# Patient Record
Sex: FEMALE | Race: WHITE | NOT HISPANIC OR LATINO | URBAN - METROPOLITAN AREA
[De-identification: names, ages, dates, MRNs, and addresses within clinical notes are randomized per-mention and may not be internally consistent; named-entity substitution may affect disease eponyms.]

---

## 2017-07-22 ENCOUNTER — EMERGENCY (EMERGENCY)
Facility: HOSPITAL | Age: 72
LOS: 1 days | Discharge: HOME | End: 2017-07-22
Attending: EMERGENCY MEDICINE | Admitting: EMERGENCY MEDICINE
Payer: MEDICARE

## 2017-07-22 VITALS
RESPIRATION RATE: 17 BRPM | TEMPERATURE: 98 F | DIASTOLIC BLOOD PRESSURE: 53 MMHG | OXYGEN SATURATION: 97 % | HEART RATE: 78 BPM | SYSTOLIC BLOOD PRESSURE: 121 MMHG

## 2017-07-22 VITALS
DIASTOLIC BLOOD PRESSURE: 57 MMHG | OXYGEN SATURATION: 96 % | SYSTOLIC BLOOD PRESSURE: 136 MMHG | TEMPERATURE: 99 F | RESPIRATION RATE: 20 BRPM | HEART RATE: 97 BPM

## 2017-07-22 LAB
ANION GAP SERPL CALC-SCNC: 13 MMOL/L — SIGNIFICANT CHANGE UP (ref 5–17)
APPEARANCE UR: CLEAR — SIGNIFICANT CHANGE UP
BACTERIA # UR AUTO: ABNORMAL /HPF
BASOPHILS # BLD AUTO: 0 K/UL — SIGNIFICANT CHANGE UP (ref 0–0.2)
BASOPHILS NFR BLD AUTO: 0.6 % — SIGNIFICANT CHANGE UP (ref 0–2)
BILIRUB UR-MCNC: NEGATIVE — SIGNIFICANT CHANGE UP
BUN SERPL-MCNC: 10 MG/DL — SIGNIFICANT CHANGE UP (ref 7–23)
CALCIUM SERPL-MCNC: 9.3 MG/DL — SIGNIFICANT CHANGE UP (ref 8.4–10.5)
CHLORIDE SERPL-SCNC: 100 MMOL/L — SIGNIFICANT CHANGE UP (ref 96–108)
CO2 SERPL-SCNC: 26 MMOL/L — SIGNIFICANT CHANGE UP (ref 22–31)
COLOR SPEC: YELLOW — SIGNIFICANT CHANGE UP
CREAT SERPL-MCNC: 0.76 MG/DL — SIGNIFICANT CHANGE UP (ref 0.5–1.3)
DIFF PNL FLD: NEGATIVE — SIGNIFICANT CHANGE UP
EOSINOPHIL # BLD AUTO: 0.1 K/UL — SIGNIFICANT CHANGE UP (ref 0–0.5)
EOSINOPHIL NFR BLD AUTO: 1.4 % — SIGNIFICANT CHANGE UP (ref 0–6)
GAS PNL BLDV: SIGNIFICANT CHANGE UP
GLUCOSE SERPL-MCNC: 98 MG/DL — SIGNIFICANT CHANGE UP (ref 70–99)
GLUCOSE UR QL: NEGATIVE — SIGNIFICANT CHANGE UP
HCT VFR BLD CALC: 39.9 % — SIGNIFICANT CHANGE UP (ref 34.5–45)
HGB BLD-MCNC: 13.5 G/DL — SIGNIFICANT CHANGE UP (ref 11.5–15.5)
KETONES UR-MCNC: NEGATIVE — SIGNIFICANT CHANGE UP
LEUKOCYTE ESTERASE UR-ACNC: NEGATIVE — SIGNIFICANT CHANGE UP
LYMPHOCYTES # BLD AUTO: 0.8 K/UL — LOW (ref 1–3.3)
LYMPHOCYTES # BLD AUTO: 12.7 % — LOW (ref 13–44)
MCHC RBC-ENTMCNC: 32.5 PG — SIGNIFICANT CHANGE UP (ref 27–34)
MCHC RBC-ENTMCNC: 33.8 GM/DL — SIGNIFICANT CHANGE UP (ref 32–36)
MCV RBC AUTO: 96.3 FL — SIGNIFICANT CHANGE UP (ref 80–100)
MONOCYTES # BLD AUTO: 0.9 K/UL — SIGNIFICANT CHANGE UP (ref 0–0.9)
MONOCYTES NFR BLD AUTO: 14.4 % — HIGH (ref 2–14)
NEUTROPHILS # BLD AUTO: 4.2 K/UL — SIGNIFICANT CHANGE UP (ref 1.8–7.4)
NEUTROPHILS NFR BLD AUTO: 70.8 % — SIGNIFICANT CHANGE UP (ref 43–77)
NITRITE UR-MCNC: NEGATIVE — SIGNIFICANT CHANGE UP
PH UR: 6.5 — SIGNIFICANT CHANGE UP (ref 5–8)
PLATELET # BLD AUTO: 250 K/UL — SIGNIFICANT CHANGE UP (ref 150–400)
POTASSIUM SERPL-MCNC: 4.1 MMOL/L — SIGNIFICANT CHANGE UP (ref 3.5–5.3)
POTASSIUM SERPL-SCNC: 4.1 MMOL/L — SIGNIFICANT CHANGE UP (ref 3.5–5.3)
PROT UR-MCNC: NEGATIVE — SIGNIFICANT CHANGE UP
RAPID RVP RESULT: SIGNIFICANT CHANGE UP
RBC # BLD: 4.14 M/UL — SIGNIFICANT CHANGE UP (ref 3.8–5.2)
RBC # FLD: 11.2 % — SIGNIFICANT CHANGE UP (ref 10.3–14.5)
RBC CASTS # UR COMP ASSIST: SIGNIFICANT CHANGE UP /HPF (ref 0–2)
SODIUM SERPL-SCNC: 139 MMOL/L — SIGNIFICANT CHANGE UP (ref 135–145)
SP GR SPEC: 1.01 — LOW (ref 1.01–1.02)
UROBILINOGEN FLD QL: NEGATIVE — SIGNIFICANT CHANGE UP
WBC # BLD: 5.9 K/UL — SIGNIFICANT CHANGE UP (ref 3.8–10.5)
WBC # FLD AUTO: 5.9 K/UL — SIGNIFICANT CHANGE UP (ref 3.8–10.5)
WBC UR QL: SIGNIFICANT CHANGE UP /HPF (ref 0–5)

## 2017-07-22 PROCEDURE — 84132 ASSAY OF SERUM POTASSIUM: CPT

## 2017-07-22 PROCEDURE — 83605 ASSAY OF LACTIC ACID: CPT

## 2017-07-22 PROCEDURE — 82803 BLOOD GASES ANY COMBINATION: CPT

## 2017-07-22 PROCEDURE — 82330 ASSAY OF CALCIUM: CPT

## 2017-07-22 PROCEDURE — 87040 BLOOD CULTURE FOR BACTERIA: CPT

## 2017-07-22 PROCEDURE — 81001 URINALYSIS AUTO W/SCOPE: CPT

## 2017-07-22 PROCEDURE — 94640 AIRWAY INHALATION TREATMENT: CPT

## 2017-07-22 PROCEDURE — 84295 ASSAY OF SERUM SODIUM: CPT

## 2017-07-22 PROCEDURE — 87581 M.PNEUMON DNA AMP PROBE: CPT

## 2017-07-22 PROCEDURE — 80048 BASIC METABOLIC PNL TOTAL CA: CPT

## 2017-07-22 PROCEDURE — 71046 X-RAY EXAM CHEST 2 VIEWS: CPT

## 2017-07-22 PROCEDURE — 87486 CHLMYD PNEUM DNA AMP PROBE: CPT

## 2017-07-22 PROCEDURE — 71020: CPT | Mod: 26

## 2017-07-22 PROCEDURE — 82435 ASSAY OF BLOOD CHLORIDE: CPT

## 2017-07-22 PROCEDURE — 84484 ASSAY OF TROPONIN QUANT: CPT

## 2017-07-22 PROCEDURE — 85014 HEMATOCRIT: CPT

## 2017-07-22 PROCEDURE — 87798 DETECT AGENT NOS DNA AMP: CPT

## 2017-07-22 PROCEDURE — 99284 EMERGENCY DEPT VISIT MOD MDM: CPT | Mod: 25

## 2017-07-22 PROCEDURE — 82947 ASSAY GLUCOSE BLOOD QUANT: CPT

## 2017-07-22 PROCEDURE — 87633 RESP VIRUS 12-25 TARGETS: CPT

## 2017-07-22 PROCEDURE — 93005 ELECTROCARDIOGRAM TRACING: CPT

## 2017-07-22 PROCEDURE — 85027 COMPLETE CBC AUTOMATED: CPT

## 2017-07-22 RX ORDER — GABAPENTIN 400 MG/1
0 CAPSULE ORAL
Qty: 0 | Refills: 0 | COMMUNITY

## 2017-07-22 RX ORDER — IPRATROPIUM/ALBUTEROL SULFATE 18-103MCG
3 AEROSOL WITH ADAPTER (GRAM) INHALATION ONCE
Qty: 0 | Refills: 0 | Status: COMPLETED | OUTPATIENT
Start: 2017-07-22 | End: 2017-07-22

## 2017-07-22 RX ORDER — SODIUM CHLORIDE 9 MG/ML
1000 INJECTION INTRAMUSCULAR; INTRAVENOUS; SUBCUTANEOUS ONCE
Qty: 0 | Refills: 0 | Status: COMPLETED | OUTPATIENT
Start: 2017-07-22 | End: 2017-07-22

## 2017-07-22 RX ORDER — ALBUTEROL 90 UG/1
2 AEROSOL, METERED ORAL
Qty: 1 | Refills: 0 | OUTPATIENT
Start: 2017-07-22 | End: 2017-08-21

## 2017-07-22 RX ORDER — NORTRIPTYLINE HYDROCHLORIDE 10 MG/1
0 CAPSULE ORAL
Qty: 0 | Refills: 0 | COMMUNITY

## 2017-07-22 RX ADMIN — SODIUM CHLORIDE 4000 MILLILITER(S): 9 INJECTION INTRAMUSCULAR; INTRAVENOUS; SUBCUTANEOUS at 06:42

## 2017-07-22 RX ADMIN — Medication 3 MILLILITER(S): at 07:56

## 2017-07-22 NOTE — ED ADULT NURSE REASSESSMENT NOTE - NS ED NURSE REASSESS COMMENT FT1
report received from MILTON Sanchez. Pt. AOx3, sitting in stretcher comfortably, breathing unlabored on RA. Skin warm pink and dry. Denies any pain/discomfort. Family at bedside. Pending results.

## 2017-07-22 NOTE — ED PROVIDER NOTE - OBJECTIVE STATEMENT
patient woke up with choking sensation and difficulty breathing. has had cough x days. denies chest pain. feels at baseline now.

## 2017-07-22 NOTE — ED PROVIDER NOTE - PLAN OF CARE
You were seen in the ED for a cough, your blood work and chest x-ray show no concerning abnormalities. You likely have bronchitis. Please complete the course of antibiotics prescribed to you.     You may use the albuterol inhaler as needed for cough. Return to the ED for worsening cough, trouble breathing or any other concerns

## 2017-07-22 NOTE — ED PROVIDER NOTE - PROGRESS NOTE DETAILS
Attending MD Cisse: patient re-evaluated, feels better. Awaiting troponin and UA. Will dc if negative

## 2017-07-22 NOTE — ED PROVIDER NOTE - CARE PLAN
Principal Discharge DX:	Cough  Instructions for follow-up, activity and diet:	You were seen in the ED for a cough, your blood work and chest x-ray show no concerning abnormalities. You likely have bronchitis. Please complete the course of antibiotics prescribed to you.     You may use the albuterol inhaler as needed for cough. Return to the ED for worsening cough, trouble breathing or any other concerns

## 2017-07-22 NOTE — ED PROVIDER NOTE - MEDICAL DECISION MAKING DETAILS
Attending Note ([place]): patient with episode of dyspnea this am that resolved. likely coughing fit.  will perform cxr and labs, low suspicion acs.  if negative, dc.

## 2017-07-22 NOTE — ED PROVIDER NOTE - MUSCULOSKELETAL, MLM
Spine appears normal, range of motion is not limited, no muscle or joint tenderness. no calf tenderness

## 2017-07-22 NOTE — ED ADULT NURSE NOTE - OBJECTIVE STATEMENT
Patient [id number] arrived ambulatory from triage c/o of Diff breathing and fever x 4 days. No PMH. Highest recorded fever 103 on Wednesday. Pt seen by PCP and placed on Levaquin, but today began having diff breathing. Pt took Tylenol for fever. Denies Dizziness weakness. Patient 02 sat 97% RA. Labs drawn and sent. Flu swab sent. Cultures drawn. Pt taken for xray, and fluids given. Afebrile at this time. Pending urine collection.

## 2017-07-22 NOTE — ED ADULT NURSE NOTE - CHPI ED SYMPTOMS NEG
no fever/no abdominal pain/no rash/no vomiting/no headache/no decreased eating/drinking/no chills/no cough/no diarrhea

## 2017-07-27 LAB
CULTURE RESULTS: SIGNIFICANT CHANGE UP
CULTURE RESULTS: SIGNIFICANT CHANGE UP
SPECIMEN SOURCE: SIGNIFICANT CHANGE UP
SPECIMEN SOURCE: SIGNIFICANT CHANGE UP

## 2017-12-20 ENCOUNTER — MEDICATION RENEWAL (OUTPATIENT)
Age: 72
End: 2017-12-20

## 2017-12-20 ENCOUNTER — APPOINTMENT (OUTPATIENT)
Dept: ENDOCRINOLOGY | Facility: CLINIC | Age: 72
End: 2017-12-20
Payer: MEDICARE

## 2017-12-20 VITALS
SYSTOLIC BLOOD PRESSURE: 124 MMHG | DIASTOLIC BLOOD PRESSURE: 58 MMHG | WEIGHT: 110 LBS | BODY MASS INDEX: 18.78 KG/M2 | HEART RATE: 69 BPM | HEIGHT: 64 IN | OXYGEN SATURATION: 99 %

## 2017-12-20 PROCEDURE — 77080 DXA BONE DENSITY AXIAL: CPT

## 2017-12-20 PROCEDURE — 99214 OFFICE O/P EST MOD 30 MIN: CPT | Mod: 25

## 2017-12-21 LAB
25(OH)D3 SERPL-MCNC: 47.5 NG/ML
CALCIUM SERPL-MCNC: 9.3 MG/DL
PARATHYROID HORMONE INTACT: 50 PG/ML
TSH SERPL-ACNC: 2.25 UIU/ML

## 2018-01-19 ENCOUNTER — APPOINTMENT (OUTPATIENT)
Dept: ENDOCRINOLOGY | Facility: CLINIC | Age: 73
End: 2018-01-19

## 2018-02-05 ENCOUNTER — APPOINTMENT (OUTPATIENT)
Dept: ENDOCRINOLOGY | Facility: CLINIC | Age: 73
End: 2018-02-05
Payer: MEDICARE

## 2018-02-05 VITALS
OXYGEN SATURATION: 98 % | BODY MASS INDEX: 18.78 KG/M2 | DIASTOLIC BLOOD PRESSURE: 52 MMHG | SYSTOLIC BLOOD PRESSURE: 136 MMHG | HEIGHT: 64 IN | WEIGHT: 110 LBS | HEART RATE: 80 BPM

## 2018-02-05 PROCEDURE — 99213 OFFICE O/P EST LOW 20 MIN: CPT

## 2018-04-11 ENCOUNTER — APPOINTMENT (OUTPATIENT)
Dept: ENDOCRINOLOGY | Facility: CLINIC | Age: 73
End: 2018-04-11
Payer: MEDICARE

## 2018-04-11 ENCOUNTER — MED ADMIN CHARGE (OUTPATIENT)
Age: 73
End: 2018-04-11

## 2018-04-11 PROCEDURE — 96372 THER/PROPH/DIAG INJ SC/IM: CPT

## 2018-04-11 RX ORDER — DENOSUMAB 60 MG/ML
60 INJECTION SUBCUTANEOUS
Qty: 0 | Refills: 0 | Status: COMPLETED | OUTPATIENT
Start: 2018-04-11

## 2018-04-11 RX ADMIN — DENOSUMAB 0 MG/ML: 60 INJECTION SUBCUTANEOUS at 00:00

## 2018-04-13 LAB
ALBUMIN SERPL ELPH-MCNC: 4.1 G/DL
ALP BLD-CCNC: 59 U/L
ALT SERPL-CCNC: 19 U/L
ANION GAP SERPL CALC-SCNC: 11 MMOL/L
AST SERPL-CCNC: 28 U/L
BILIRUB SERPL-MCNC: 0.2 MG/DL
BUN SERPL-MCNC: 10 MG/DL
CALCIUM SERPL-MCNC: 8.9 MG/DL
CHLORIDE SERPL-SCNC: 104 MMOL/L
CO2 SERPL-SCNC: 29 MMOL/L
CREAT SERPL-MCNC: 0.69 MG/DL
GLUCOSE SERPL-MCNC: 82 MG/DL
POTASSIUM SERPL-SCNC: 4.3 MMOL/L
PROT SERPL-MCNC: 6.5 G/DL
SODIUM SERPL-SCNC: 144 MMOL/L

## 2018-05-29 ENCOUNTER — EMERGENCY (EMERGENCY)
Facility: HOSPITAL | Age: 73
LOS: 1 days | Discharge: HOME | End: 2018-05-29
Attending: EMERGENCY MEDICINE | Admitting: EMERGENCY MEDICINE
Payer: MEDICARE

## 2018-05-29 VITALS
SYSTOLIC BLOOD PRESSURE: 154 MMHG | TEMPERATURE: 99 F | RESPIRATION RATE: 17 BRPM | WEIGHT: 104.94 LBS | HEIGHT: 64 IN | HEART RATE: 91 BPM | OXYGEN SATURATION: 96 % | DIASTOLIC BLOOD PRESSURE: 82 MMHG

## 2018-05-29 PROCEDURE — 73564 X-RAY EXAM KNEE 4 OR MORE: CPT | Mod: 26,LT

## 2018-05-29 PROCEDURE — 99283 EMERGENCY DEPT VISIT LOW MDM: CPT

## 2018-05-29 PROCEDURE — 73564 X-RAY EXAM KNEE 4 OR MORE: CPT

## 2018-05-29 NOTE — ED PROVIDER NOTE - PLAN OF CARE
1. Follow up with PMD within 24-48 hours.   2. Rest, ice and elevate knee.  Ambulate as tolerated.   Take acetaminophen (Tylenol) 650mg (2 regular strength tablets) as often as every 6 hours as needed for pain. Never take more than 4000mg of acetaminophen/Tylenol in any 24 hour period. Take ibuprofen (or Motrin) 600mg (3 tablets) up to 4 times per day as needed for pain with food or milk.    3. Worsening pain, swelling, weakness, numbness return to ER

## 2018-05-29 NOTE — ED PROVIDER NOTE - LOWER EXTREMITY EXAM, LEFT
+mild swelling to lateral aspect of left knee, no TTP to patella, tibial tuberosity or medial or lateral joint line, no TTP to head of fibula. +AROM, PROM, normal anterior/posterior drawer test./SWELLING/TENDERNESS

## 2018-05-29 NOTE — ED ADULT NURSE REASSESSMENT NOTE - NS ED NURSE REASSESS COMMENT FT1
patient resting comfortably in bed in no acute distress. patient has ice pack applied to left knee. pending xray results.

## 2018-05-29 NOTE — ED PROVIDER NOTE - ATTENDING CONTRIBUTION TO CARE
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  small contusion to l infrapatellar lateral region, no tend at joint space, from, plain films nl, ambulating at baseline to d.c home with nsaid, ortho follow up.

## 2018-05-29 NOTE — ED ADULT NURSE NOTE - CHIEF COMPLAINT QUOTE
trip and fall this AM, unwitnessed, no Head injury or LOC. Patient c/o left knee pain- no anticoagulants

## 2018-05-29 NOTE — ED PROVIDER NOTE - CARE PLAN
Principal Discharge DX:	Knee pain, left  Assessment and plan of treatment:	1. Follow up with PMD within 24-48 hours.   2. Rest, ice and elevate knee.  Ambulate as tolerated.   Take acetaminophen (Tylenol) 650mg (2 regular strength tablets) as often as every 6 hours as needed for pain. Never take more than 4000mg of acetaminophen/Tylenol in any 24 hour period. Take ibuprofen (or Motrin) 600mg (3 tablets) up to 4 times per day as needed for pain with food or milk.    3. Worsening pain, swelling, weakness, numbness return to ER

## 2018-05-29 NOTE — ED ADULT NURSE NOTE - OBJECTIVE STATEMENT
71 yo female presents to the ED from home c/o left knee pain s/p fall today. patent states she tripped and fell onto wood floor and did not hit head or have LOC. patient c/o left knee pain 7/10 on ambulation and with ROM. full ROM noted with pain. bruising and swelling noted to left knee. skin intact. patient denies chest pain, SOB, fevers, chills, N/V/D, HA, dizziness, cough, abdominal pain. VSS. MD at the bedside.

## 2018-05-29 NOTE — ED PROVIDER NOTE - OBJECTIVE STATEMENT
71 YO female PMhx chronic pain after colonoscopy complications of nerve damage on neurontin and norytriptiline, presents to ED c/o left knee pain s/p fall 9 AM this morning. Pt states she was at hotel in [place], had a mechanical trip and fall forward landing on her left knee. Pt was able to stand and walk with assistance. Describes swelling to the lateral aspect of left knee and some tenderness to palpation over area. Able to walk with some discomfort. Pt drove to ED using right leg. She has not used any pain medications and has not iced her left knee. Defers pain meds at this time. Denies head trauma, any other injuries, LOC, syncope, blood thinners, previous surgeries to left knee, numbness, tingling or weakness to left lower extremity.

## 2018-10-17 ENCOUNTER — APPOINTMENT (OUTPATIENT)
Dept: ENDOCRINOLOGY | Facility: CLINIC | Age: 73
End: 2018-10-17
Payer: MEDICARE

## 2018-10-17 VITALS
OXYGEN SATURATION: 98 % | WEIGHT: 112 LBS | SYSTOLIC BLOOD PRESSURE: 126 MMHG | DIASTOLIC BLOOD PRESSURE: 64 MMHG | HEART RATE: 68 BPM | HEIGHT: 64 IN | BODY MASS INDEX: 19.12 KG/M2

## 2018-10-17 PROBLEM — G89.28 OTHER CHRONIC POSTPROCEDURAL PAIN: Chronic | Status: ACTIVE | Noted: 2017-07-22

## 2018-10-17 PROCEDURE — 99214 OFFICE O/P EST MOD 30 MIN: CPT | Mod: 25

## 2018-10-17 PROCEDURE — 96401 CHEMO ANTI-NEOPL SQ/IM: CPT

## 2018-10-17 RX ORDER — ASPIRIN 81 MG
81 TABLET, DELAYED RELEASE (ENTERIC COATED) ORAL
Refills: 0 | Status: ACTIVE | COMMUNITY

## 2018-10-17 RX ORDER — DENOSUMAB 60 MG/ML
60 INJECTION SUBCUTANEOUS
Qty: 1 | Refills: 0 | Status: COMPLETED | OUTPATIENT
Start: 2018-10-17

## 2018-10-17 RX ADMIN — DENOSUMAB 0 MG/ML: 60 INJECTION SUBCUTANEOUS at 00:00

## 2019-04-19 ENCOUNTER — APPOINTMENT (OUTPATIENT)
Dept: ENDOCRINOLOGY | Facility: CLINIC | Age: 74
End: 2019-04-19
Payer: MEDICARE

## 2019-04-19 VITALS
HEART RATE: 80 BPM | WEIGHT: 105 LBS | DIASTOLIC BLOOD PRESSURE: 66 MMHG | HEIGHT: 63.9 IN | SYSTOLIC BLOOD PRESSURE: 120 MMHG | BODY MASS INDEX: 18.15 KG/M2 | OXYGEN SATURATION: 99 %

## 2019-04-19 VITALS — WEIGHT: 105 LBS | HEIGHT: 63.9 IN | BODY MASS INDEX: 18.15 KG/M2

## 2019-04-19 PROCEDURE — 99214 OFFICE O/P EST MOD 30 MIN: CPT | Mod: 25

## 2019-04-19 PROCEDURE — ZZZZZ: CPT

## 2019-04-19 PROCEDURE — 77080 DXA BONE DENSITY AXIAL: CPT

## 2019-04-19 PROCEDURE — 96401 CHEMO ANTI-NEOPL SQ/IM: CPT

## 2019-04-19 RX ORDER — ALPRAZOLAM 0.25 MG/1
0.25 TABLET ORAL
Qty: 30 | Refills: 0 | Status: COMPLETED | COMMUNITY
Start: 2019-03-07

## 2019-04-19 RX ORDER — POTASSIUM CHLORIDE 750 MG/1
10 TABLET, EXTENDED RELEASE ORAL
Qty: 1 | Refills: 0 | Status: COMPLETED | COMMUNITY
Start: 2019-02-04

## 2019-04-19 RX ORDER — FUROSEMIDE 20 MG/1
20 TABLET ORAL
Qty: 2 | Refills: 0 | Status: COMPLETED | COMMUNITY
Start: 2019-02-04

## 2019-04-19 RX ORDER — METOPROLOL TARTRATE 25 MG/1
25 TABLET, FILM COATED ORAL
Qty: 90 | Refills: 0 | Status: COMPLETED | COMMUNITY
Start: 2019-03-01

## 2019-04-19 RX ORDER — ATENOLOL 25 MG/1
25 TABLET ORAL
Qty: 180 | Refills: 0 | Status: COMPLETED | COMMUNITY
Start: 2019-03-31

## 2019-04-19 RX ORDER — ATENOLOL 25 MG/1
25 TABLET ORAL
Refills: 0 | Status: ACTIVE | COMMUNITY

## 2019-04-19 RX ORDER — OXYCODONE 5 MG/1
5 TABLET ORAL
Qty: 30 | Refills: 0 | Status: COMPLETED | COMMUNITY
Start: 2019-02-04

## 2019-04-19 RX ORDER — DENOSUMAB 60 MG/ML
60 INJECTION SUBCUTANEOUS
Qty: 1 | Refills: 0 | Status: COMPLETED | COMMUNITY
Start: 2019-04-03

## 2019-04-19 RX ORDER — DENOSUMAB 60 MG/ML
60 INJECTION SUBCUTANEOUS
Qty: 1 | Refills: 0 | Status: COMPLETED | OUTPATIENT
Start: 2019-04-19

## 2019-04-19 RX ORDER — MUPIROCIN 20 MG/G
2 OINTMENT TOPICAL
Qty: 22 | Refills: 0 | Status: COMPLETED | COMMUNITY
Start: 2019-01-16

## 2019-04-19 RX ADMIN — DENOSUMAB 60 MG/ML: 60 INJECTION SUBCUTANEOUS at 00:00

## 2019-04-19 NOTE — PHYSICAL EXAM
[Alert] : alert [No Acute Distress] : no acute distress [Well Nourished] : well nourished [Well Developed] : well developed [Normal Sclera/Conjunctiva] : normal sclera/conjunctiva [EOMI] : extra ocular movement intact [No Proptosis] : no proptosis [Normal Oropharynx] : the oropharynx was normal [Thyroid Not Enlarged] : the thyroid was not enlarged [No Thyroid Nodules] : there were no palpable thyroid nodules [No Respiratory Distress] : no respiratory distress [No Accessory Muscle Use] : no accessory muscle use [Clear to Auscultation] : lungs were clear to auscultation bilaterally [Normal Bowel Sounds] : normal bowel sounds [Not Tender] : non-tender [Soft] : abdomen soft [Not Distended] : not distended [Anterior Cervical Nodes] : anterior cervical nodes [Normal] : normal and non tender [No Spinal Tenderness] : no spinal tenderness [No Stigmata of Cushings Syndrome] : no stigmata of cushings syndrome [Spine Straight] : spine straight [Normal Gait] : normal gait [Normal Strength/Tone] : muscle strength and tone were normal [No Rash] : no rash [Normal Reflexes] : deep tendon reflexes were 2+ and symmetric [Oriented x3] : oriented to person, place, and time [Acanthosis Nigricans] : no acanthosis nigricans [de-identified] : left upper and lower molar tooth extraction sites healing [de-identified] : destiny dukes [de-identified] : systolic murmur

## 2019-04-19 NOTE — PROCEDURE
[FreeTextEntry1] : Bone mineral density: 04/19/2019 \par Indication: vs. 2017 \par Spine: -2.7 osteoporosis (+5.6%)\par Total hip: -2.7 osteoporosis, no significant change (+5.6% vs 2016)\par Femoral neck: -1.9 osteopenia (+7.9%)\par Proximal radius: -3.7 osteoporosis, no significant change \par \par Bone mineral density 12/20/17\par indication: assess response to medication in setting of severely low radius, no prior BMD at site\par spine -3.1, osteoporosis, no significant change from 2016\par total hip -2.8 osteoporosis, no significant change from 2016\par femoral neck -2.3 osteopenia no significant change from 2016\par proximal radius -3.8 osteoporosis decreased from prior study in 2016\par \par Bone mineral density test December 21, 2016\par  Spine -2.9, osteoporosis, no significant change versus outside study 2015\par Total hip -2.9, osteoporosis\par Femoral neck -2.3, osteopenia, slightly improved versus prior study\par Proximal radius -3.5, osteoporosis, no prior

## 2019-04-19 NOTE — ASSESSMENT
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 \par \par Had bone loss at proximal 1/3 radius despite Actonel. Pt delayed 1st Prolia dose due to dental extractions, first dose in April 2018. Taking correctly, tolerating well, no ONJ or thigh pain. BMD 4/2019 indicates improvement in spine and hip and stability in lowest site; proximal radius. c/w Vitamin D. Option of Evista for breast CA prevention previously reviewed and pt declined. Of note, pt had mitral valve repair in Jan 2019. \par \par Continue Prolia from [employer]. \par \par Labs from Dr. Chacorta Bowling. \par \par f/u in 6 mons.

## 2019-04-19 NOTE — END OF VISIT
[FreeTextEntry3] : I, Bailey Brown, authored this note working as a medical scribe for Dr. Hinojosa.  04/19/2019.  9:30AM. \par This note was authored by Bailey Brown working as medical scribe for me. I have reviewed, edited, and revised the note as needed. I am in agreement with the exam findings, imaging findings, and treatment plan.  Munir Hinojosa MD

## 2019-04-19 NOTE — HISTORY OF PRESENT ILLNESS
[Calcium (supplements)] : calcium from a dietary supplement [Risedronate (Actonel)] : Risedronate [Patient taking Meds Correctly] : Patient is taking meds correctly [Vitamin D (supplements)] : Vitamin D as a dietary supplement [Prolia (Denosumab)] : Prolia (Denosumab) [Family History of Osteoporosis] : family history of osteoporosis [Family History of Breast Cancer] : family history of breast cancer [FreeTextEntry1] : f/u 74 y/o female with osteoporosis. \par \par Told of low bone density for at least 10-15 years but had refused therapy in the past due to fears of drug side effects.No h/o fx. She has no unusual risk factors for osteoporosis. BMD at [place] August 2015 spine -2.9, osteoporosis femoral neck -2.5, osteoporosis total hip -2.7, osteoporosis. Began Actonel 12/ 2015, took correctly and tolerated well, but repeat BMD 12/2017 decreased. Changed to Prolia, delayed for extraction of 2 molars. First dose April 2018. Taking correctly, tolerating well, no ONJ or thigh pain. Last DDS within the month. \par \par Of note, pt has mitral valve prolapse, pt had open heart surgery Jan 2019; repair. Pt now on Atenolol.  [Disordered Eating] : no past or present history of disordered eating [Taking Steroids] : no past or present history of taking steroids [Family History of Hip Fracture] : no family history of hip fracture [Kidney Stones] : no history of kidney stones [History of Radiation Therapy] : no history of radiation therapy [Hyperparathyroidism] : no hyperparathyroidism [History of Blood Clots] : no history of blood clots [Previous Fragility Fracture] : no previous fragility fracture

## 2019-10-09 RX ORDER — DENOSUMAB 60 MG/ML
60 INJECTION SUBCUTANEOUS
Qty: 1 | Refills: 1 | Status: DISCONTINUED | COMMUNITY
Start: 2017-12-20 | End: 2019-10-09

## 2019-10-24 ENCOUNTER — APPOINTMENT (OUTPATIENT)
Dept: ENDOCRINOLOGY | Facility: CLINIC | Age: 74
End: 2019-10-24

## 2019-10-25 ENCOUNTER — APPOINTMENT (OUTPATIENT)
Dept: ENDOCRINOLOGY | Facility: CLINIC | Age: 74
End: 2019-10-25
Payer: MEDICARE

## 2019-10-25 VITALS
SYSTOLIC BLOOD PRESSURE: 110 MMHG | HEART RATE: 70 BPM | OXYGEN SATURATION: 93 % | HEIGHT: 63.9 IN | WEIGHT: 105 LBS | BODY MASS INDEX: 18.15 KG/M2 | DIASTOLIC BLOOD PRESSURE: 70 MMHG

## 2019-10-25 PROCEDURE — 99214 OFFICE O/P EST MOD 30 MIN: CPT | Mod: 25

## 2019-10-25 PROCEDURE — 96401 CHEMO ANTI-NEOPL SQ/IM: CPT

## 2019-10-25 RX ORDER — DENOSUMAB 60 MG/ML
60 INJECTION SUBCUTANEOUS
Qty: 1 | Refills: 0 | Status: COMPLETED | OUTPATIENT
Start: 2019-10-25

## 2019-10-25 RX ADMIN — DENOSUMAB 60 MG/ML: 60 INJECTION SUBCUTANEOUS at 00:00

## 2019-10-25 NOTE — HISTORY OF PRESENT ILLNESS
[Risedronate (Actonel)] : Risedronate [Calcium (supplements)] : calcium from a dietary supplement [Vitamin D (supplements)] : Vitamin D as a dietary supplement [Patient taking Meds Correctly] : Patient is taking meds correctly [Prolia (Denosumab)] : Prolia (Denosumab) [Family History of Osteoporosis] : family history of osteoporosis [Family History of Breast Cancer] : family history of breast cancer [FreeTextEntry1] : f/u 74 y/o female with osteoporosis. \par \par Told of low bone density for at least 10-15 years but had refused therapy in the past due to fears of drug side effects.No h/o fx. She has no unusual risk factors for osteoporosis. BMD at [place] August 2015 spine -2.9, osteoporosis femoral neck -2.5, osteoporosis total hip -2.7, osteoporosis. Began Actonel 12/ 2015, took correctly and tolerated well, but repeat BMD 12/2017 decreased. \par \par Changed to Prolia, delayed for extraction of 2 molars. First dose April 2018. Tolerating well. No thigh pain, no interval fx. Last DDS within... No ONJ. BMD 4/2019 indicates improvement in spine and hip and stability in lowest site; proximal radius.\par \par Of note, pt has mitral valve prolapse, pt had open heart surgery Jan 2019; repair. Pt currently on Atenolol.  [Disordered Eating] : no past or present history of disordered eating [Taking Steroids] : no past or present history of taking steroids [Kidney Stones] : no history of kidney stones [Family History of Hip Fracture] : no family history of hip fracture [Hyperparathyroidism] : no hyperparathyroidism [History of Radiation Therapy] : no history of radiation therapy [History of Blood Clots] : no history of blood clots [Previous Fragility Fracture] : no previous fragility fracture

## 2019-10-25 NOTE — PHYSICAL EXAM
[Alert] : alert [No Acute Distress] : no acute distress [Well Nourished] : well nourished [Normal Sclera/Conjunctiva] : normal sclera/conjunctiva [Well Developed] : well developed [EOMI] : extra ocular movement intact [No Proptosis] : no proptosis [Normal Oropharynx] : the oropharynx was normal [Thyroid Not Enlarged] : the thyroid was not enlarged [No Thyroid Nodules] : there were no palpable thyroid nodules [No Respiratory Distress] : no respiratory distress [Clear to Auscultation] : lungs were clear to auscultation bilaterally [No Accessory Muscle Use] : no accessory muscle use [Normal Bowel Sounds] : normal bowel sounds [Not Tender] : non-tender [Soft] : abdomen soft [Not Distended] : not distended [Anterior Cervical Nodes] : anterior cervical nodes [Normal] : normal and non tender [No Spinal Tenderness] : no spinal tenderness [Spine Straight] : spine straight [No Stigmata of Cushings Syndrome] : no stigmata of cushings syndrome [No Rash] : no rash [Normal Strength/Tone] : muscle strength and tone were normal [Normal Gait] : normal gait [Oriented x3] : oriented to person, place, and time [Normal Reflexes] : deep tendon reflexes were 2+ and symmetric [Acanthosis Nigricans] : no acanthosis nigricans [de-identified] : left upper and lower molar tooth extraction sites healing [de-identified] : systolic murmur  [de-identified] : destiny dukes

## 2019-10-25 NOTE — END OF VISIT
[FreeTextEntry3] : I, Dejuan Lyons, authored this note working as a medical scribe for Dr. Hinojosa.  10/25/2019.  3:15PM. This note was authored by the medical scribe for me. I have reviewed, edited, and revised the note as needed. I am in agreement with the exam findings, imaging findings, and treatment plan.  Munir Hinojosa MD

## 2019-10-25 NOTE — ASSESSMENT
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 \par \par Had bone loss at proximal 1/3 radius despite Actonel. Pt delayed 1st Prolia dose due to dental extractions, first dose in April 2018. Taking correctly, tolerating well, no ONJ or thigh pain. BMD 4/2019 indicates improvement in spine and hip and stability in lowest site; proximal radius. Option of Evista for breast CA prevention previously reviewed and pt declined. Ca 9.1, Vitamin 50.1.\par \par Continue Prolia from [employer].\par \par f/u in 6 mons.

## 2020-04-14 ENCOUNTER — RX RENEWAL (OUTPATIENT)
Age: 75
End: 2020-04-14

## 2020-04-29 ENCOUNTER — APPOINTMENT (OUTPATIENT)
Dept: ENDOCRINOLOGY | Facility: CLINIC | Age: 75
End: 2020-04-29
Payer: MEDICARE

## 2020-04-29 LAB
ALBUMIN SERPL ELPH-MCNC: 4.7 G/DL
ALP BLD-CCNC: 45 U/L
ALT SERPL-CCNC: 12 U/L
ANION GAP SERPL CALC-SCNC: 12 MMOL/L
AST SERPL-CCNC: 20 U/L
BILIRUB SERPL-MCNC: 0.2 MG/DL
BUN SERPL-MCNC: 15 MG/DL
CALCIUM SERPL-MCNC: 9.7 MG/DL
CHLORIDE SERPL-SCNC: 102 MMOL/L
CO2 SERPL-SCNC: 29 MMOL/L
CREAT SERPL-MCNC: 0.73 MG/DL
GLUCOSE SERPL-MCNC: 86 MG/DL
POTASSIUM SERPL-SCNC: 4.4 MMOL/L
PROT SERPL-MCNC: 6.7 G/DL
SODIUM SERPL-SCNC: 143 MMOL/L

## 2020-04-29 PROCEDURE — 96401 CHEMO ANTI-NEOPL SQ/IM: CPT

## 2020-04-29 RX ORDER — DENOSUMAB 60 MG/ML
60 INJECTION SUBCUTANEOUS
Qty: 1 | Refills: 0 | Status: COMPLETED | OUTPATIENT
Start: 2020-04-29

## 2020-04-29 RX ADMIN — DENOSUMAB 0 MG/ML: 60 INJECTION SUBCUTANEOUS at 00:00

## 2020-10-14 ENCOUNTER — APPOINTMENT (OUTPATIENT)
Dept: ENDOCRINOLOGY | Facility: CLINIC | Age: 75
End: 2020-10-14
Payer: MEDICARE

## 2020-10-14 VITALS
WEIGHT: 109 LBS | HEIGHT: 63 IN | SYSTOLIC BLOOD PRESSURE: 124 MMHG | HEART RATE: 77 BPM | DIASTOLIC BLOOD PRESSURE: 60 MMHG | BODY MASS INDEX: 19.31 KG/M2 | OXYGEN SATURATION: 98 %

## 2020-10-14 PROCEDURE — 99213 OFFICE O/P EST LOW 20 MIN: CPT

## 2020-10-14 RX ORDER — CARVEDILOL 3.12 MG/1
3.12 TABLET, FILM COATED ORAL
Refills: 0 | Status: DISCONTINUED | COMMUNITY
End: 2020-10-14

## 2020-10-14 RX ORDER — CHROMIUM 200 MCG
1000 TABLET ORAL
Refills: 0 | Status: ACTIVE | COMMUNITY

## 2020-10-15 NOTE — ASSESSMENT
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 \par \par Had bone loss at proximal 1/3 radius despite Actonel. Pt delayed 1st Prolia dose due to dental extractions, first dose in April 2018. Taking correctly, tolerating well, no ONJ or thigh pain. BMD 4/2019 indicates improvement in spine and hip and stability in lowest site; proximal radius. Option of Evista for breast CA prevention previously reviewed and pt declined. Ca 9.1, Vitamin 50.1.\par \par due for  Prolia 2 weeks  \par Hypertension: Blood pressure well controlled on current medication. \par epeat BMD next visit

## 2020-10-15 NOTE — HISTORY OF PRESENT ILLNESS
[Risedronate (Actonel)] : Risedronate [Vitamin D (supplements)] : Vitamin D as a dietary supplement [Calcium (supplements)] : calcium from a dietary supplement [Prolia (Denosumab)] : Prolia (Denosumab) [Patient taking Meds Correctly] : Patient is taking meds correctly [Family History of Breast Cancer] : family history of breast cancer [Family History of Osteoporosis] : family history of osteoporosis [Disordered Eating] : no past or present history of disordered eating [FreeTextEntry1] : .\par Told of low bone density for at least 10-15 years but had refused therapy in the past due to fears of drug side effects.No h/o fx. She has no unusual risk factors for osteoporosis. BMD at [place] August 2015 spine -2.9, osteoporosis femoral neck -2.5, osteoporosis total hip -2.7, osteoporosis. Began Actonel 12/ 2015, took correctly and tolerated well, but repeat BMD 12/2017 decreased. \par \par Changed to Prolia, delayed for extraction of 2 molars. First dose April 2018. Tolerating well. No thigh pain, no interval fx. Last DDS within... No ONJ. BMD 4/2019 indicates improvement in spine and hip and stability in lowest site; proximal radius.\par \par Of note, pt has mitral valve prolapse, pt had open heart surgery Jan 2019; repair. Pt currently on Atenolol.  [Kidney Stones] : no history of kidney stones [Taking Steroids] : no past or present history of taking steroids [Hyperparathyroidism] : no hyperparathyroidism [Family History of Hip Fracture] : no family history of hip fracture [History of Radiation Therapy] : no history of radiation therapy [History of Blood Clots] : no history of blood clots [Previous Fragility Fracture] : no previous fragility fracture

## 2020-10-15 NOTE — PHYSICAL EXAM
[Well Nourished] : well nourished [Alert] : alert [No Acute Distress] : no acute distress [EOMI] : extra ocular movement intact [Normal Sclera/Conjunctiva] : normal sclera/conjunctiva [Well Developed] : well developed [No Proptosis] : no proptosis [Thyroid Not Enlarged] : the thyroid was not enlarged [Normal Oropharynx] : the oropharynx was normal [No Thyroid Nodules] : no palpable thyroid nodules [Normal S1, S2] : normal S1 and S2 [Clear to Auscultation] : lungs were clear to auscultation bilaterally [Normal Rate] : heart rate was normal [Regular Rhythm] : with a regular rhythm [No Edema] : no peripheral edema [Normal Bowel Sounds] : normal bowel sounds [Not Distended] : not distended [Not Tender] : non-tender [Normal Posterior Cervical Nodes] : no posterior cervical lymphadenopathy [Soft] : abdomen soft [Normal Anterior Cervical Nodes] : no anterior cervical lymphadenopathy [No Spinal Tenderness] : no spinal tenderness [Spine Straight] : spine straight [No Stigmata of Cushings Syndrome] : no stigmata of Cushings Syndrome [No Rash] : no rash [Normal Gait] : normal gait [Normal Strength/Tone] : muscle strength and tone were normal [Acanthosis Nigricans] : no acanthosis nigricans [Normal Reflexes] : deep tendon reflexes were 2+ and symmetric [No Tremors] : no tremors [Oriented x3] : oriented to person, place, and time

## 2020-11-02 ENCOUNTER — APPOINTMENT (OUTPATIENT)
Dept: ENDOCRINOLOGY | Facility: CLINIC | Age: 75
End: 2020-11-02
Payer: MEDICARE

## 2020-11-02 PROCEDURE — 99072 ADDL SUPL MATRL&STAF TM PHE: CPT

## 2020-11-02 PROCEDURE — 96401 CHEMO ANTI-NEOPL SQ/IM: CPT

## 2020-11-02 RX ORDER — DENOSUMAB 60 MG/ML
60 INJECTION SUBCUTANEOUS
Qty: 1 | Refills: 0 | Status: COMPLETED | OUTPATIENT
Start: 2020-11-02

## 2020-11-02 RX ADMIN — DENOSUMAB 0 MG/ML: 60 INJECTION SUBCUTANEOUS at 00:00

## 2021-01-24 NOTE — ED PROVIDER NOTE - CROS ED SKIN ALL NEG
Anesthesia Pre-Procedure Evaluation    Patient: Delroy Christianson   MRN: [id number] : 1960        Preoperative Diagnosis: Hiatal hernia [K44.9]   Procedure : Procedure(s):  ESOPHAGOGASTRODUODENOSCOPY (EGD) THOROSCOPIC GUIDED PLACEMENT NG TUBE PLACEMENT  CREATION, JEJUNOSTOMY, PERCUTANEOUS, ENDOSCOPIC     Past Medical History:   Diagnosis Date     Allergic rhinitis      Sinusitis       No past surgical history on file.   No Known Allergies   Social History     Tobacco Use     Smoking status: Never Smoker     Smokeless tobacco: Never Used   Substance Use Topics     Alcohol use: No     Alcohol/week: 0.0 standard drinks      Wt Readings from Last 1 Encounters:   21 84.7 kg (186 lb 12.8 oz)        Anesthesia Evaluation            ROS/MED HX  ENT/Pulmonary:  - neg pulmonary ROS     Neurologic:  - neg neurologic ROS     Cardiovascular: Comment: Hypertrophic cardiomyopathy    (+) hypertension-----dysrhythmias, Other, Previous cardiac testing   Echo: Date: 2020 Results:  Hyperdynamic left ventricular function. The visual ejection fraction is  estimated at >70%.  Echocardiographic findings concerning for hypertrophic cardiomyopathy. Basal  anteroseptum measures 2.2cm. There is systolic anterior motion of the anterior  mitral leaflet present. Mild flow acceleration across the LVOT, rest gradient  15 mmHg. Peak gradient 29 mmHg wtih Valsalva maneuver.  Right ventricular global function is normal.  No significant valvular abnormalities.  Stress Test: Date: Results:    ECG Reviewed: Date: 2021 Results:  Qtc 488, sinus rhythm  Cath: Date: Results:      METS/Exercise Tolerance:     Hematologic:  - neg hematologic  ROS     Musculoskeletal:  - neg musculoskeletal ROS     GI/Hepatic:     (+) hiatal hernia,     Renal/Genitourinary:     (+) renal disease, type: CRI,     Endo:  - neg endo ROS     Psychiatric/Substance Use:  - neg psychiatric ROS     Infectious Disease:  - neg infectious disease ROS     Malignancy:  - neg  malignancy ROS     Other:  - neg other ROS          Physical Exam    Airway  airway exam normal       TM distance: > 3 FB   Neck ROM: full   Mouth opening: > 3 cm    Respiratory Devices and Support         Dental  no notable dental history         Cardiovascular   cardiovascular exam normal          Pulmonary   pulmonary exam normal                OUTSIDE LABS:  CBC:   Lab Results   Component Value Date    WBC 10.1 01/24/2021    WBC 10.7 01/23/2021    HGB 14.7 01/24/2021    HGB 15.0 01/23/2021    HCT 43.6 01/24/2021    HCT 45.1 01/23/2021     01/24/2021     01/23/2021     BMP:   Lab Results   Component Value Date     01/24/2021     01/23/2021    POTASSIUM 3.4 01/24/2021    POTASSIUM 3.6 01/23/2021    CHLORIDE 107 01/24/2021    CHLORIDE 104 01/23/2021    CO2 24 01/24/2021    CO2 25 01/23/2021    BUN 23 01/24/2021    BUN 25 01/23/2021    CR 1.03 01/24/2021    CR 1.05 01/23/2021     (H) 01/24/2021    GLC 79 01/23/2021     COAGS:   Lab Results   Component Value Date    PTT 28 01/23/2021    INR 1.15 (H) 01/24/2021     POC:   Lab Results   Component Value Date     (H) 01/24/2021     HEPATIC:   Lab Results   Component Value Date    ALBUMIN 2.8 (L) 01/24/2021    PROTTOTAL 6.6 (L) 01/24/2021    ALT 26 01/24/2021    AST 22 01/24/2021    ALKPHOS 49 01/24/2021    BILITOTAL 1.0 01/24/2021     OTHER:   Lab Results   Component Value Date    PH 7.64 (HH) 04/23/2020    MINE 8.6 01/24/2021    PHOS 2.7 01/24/2021    MAG 2.4 (H) 01/24/2021    LIPASE 138 01/20/2021       Anesthesia Plan     History & Physical Review      ASA Status:  3. NPO Status:  NPO Appropriate. .  Plan for General with RSI induction. Device: ETT Maintenance will be Balanced.     Additional equipment: Videolaryngoscope.    PONV prophylaxis:  Ondansetron (or other 5HT-3) and Dexamethasone or Solumedrol.       Consents  Anesthesia Plan(s) and associated risks, benefits, and realistic alternatives discussed.    Questions answered  and patient/representative(s) expressed understanding.    Discussed with:  Patient.       Extended Intubation/Ventilatory Support Discussed No No     Use of blood products discussed: No.          Postoperative Care  Postoperative pain management: IV analgesics.           MD Zbigniew Dominguez MD  Staff Anesthesiologist  [phone]     negative...

## 2021-05-11 ENCOUNTER — APPOINTMENT (OUTPATIENT)
Dept: ENDOCRINOLOGY | Facility: CLINIC | Age: 76
End: 2021-05-11
Payer: MEDICARE

## 2021-05-11 VITALS
OXYGEN SATURATION: 98 % | TEMPERATURE: 98 F | BODY MASS INDEX: 18.67 KG/M2 | SYSTOLIC BLOOD PRESSURE: 116 MMHG | HEIGHT: 63.8 IN | WEIGHT: 108 LBS | HEART RATE: 60 BPM | DIASTOLIC BLOOD PRESSURE: 60 MMHG

## 2021-05-11 DIAGNOSIS — I10 ESSENTIAL (PRIMARY) HYPERTENSION: ICD-10-CM

## 2021-05-11 PROCEDURE — ZZZZZ: CPT

## 2021-05-11 PROCEDURE — 99072 ADDL SUPL MATRL&STAF TM PHE: CPT

## 2021-05-11 PROCEDURE — 99214 OFFICE O/P EST MOD 30 MIN: CPT | Mod: 25

## 2021-05-11 PROCEDURE — 77080 DXA BONE DENSITY AXIAL: CPT

## 2021-05-11 PROCEDURE — 96372 THER/PROPH/DIAG INJ SC/IM: CPT

## 2021-05-11 RX ORDER — DENOSUMAB 60 MG/ML
60 INJECTION SUBCUTANEOUS
Qty: 1 | Refills: 0 | Status: COMPLETED | OUTPATIENT
Start: 2021-05-11

## 2021-05-11 RX ADMIN — DENOSUMAB 60 MG/ML: 60 INJECTION SUBCUTANEOUS at 00:00

## 2021-05-11 NOTE — ASSESSMENT
[Denosumab Therapy] : Risks  and benefits of denosumab therapy were discussed with the patient including eczema, cellulitis, osteonecrosis of the jaw and atypical femur fractures [FreeTextEntry1] : 75 year-old female with postmenopausal osteoporosis.\par \par Had bone loss at proximal 1/3 radius despite Actonel. Pt delayed 1st Prolia dose due to dental extractions, first dose in April 2018. Taking correctly, tolerating well, no ONJ or thigh pain. BMD 4/2019 indicates improvement in spine and hip and stability in lowest site; proximal radius. BMD 5/2021 indicates stable osteoporosis in spine, total hip, and low osteoporosis proximal radius, stable osteopenia in femoral neck. BMD results reviewed w/ pt. Option of Evista for breast CA prevention previously reviewed but pt declined. [facility], [facility]par \par Hypertension: Blood pressure well controlled on current medication.\par \par F/u in 6 months

## 2021-05-11 NOTE — PROCEDURE
[FreeTextEntry1] : Bone mineral density: 05/11/2021 \par Indication: vs. 2019\par Spine: -2.6 osteoporosis, no significant change\par Total hip: -2.6 osteoporosis, no significant change\par Femoral neck: -1.9 osteopenia, no significant change\par Proximal radius: -3.6 osteoporosis, no significant change\par \par Bone mineral density: 04/19/2019 \par Indication: vs. 2017 \par Spine: -2.7 osteoporosis (+5.6%)\par Total hip: -2.7 osteoporosis, no significant change (+5.6% vs 2016)\par Femoral neck: -1.9 osteopenia (+7.9%)\par Proximal radius: -3.7 osteoporosis, no significant change \par \par Bone mineral density 12/20/17\par indication: assess response to medication in setting of severely low radius, no prior BMD at site\par spine -3.1, osteoporosis, no significant change from 2016\par total hip -2.8 osteoporosis, no significant change from 2016\par femoral neck -2.3 osteopenia no significant change from 2016\par proximal radius -3.8 osteoporosis decreased from prior study in 2016\par \par Bone mineral density test December 21, 2016\par  Spine -2.9, osteoporosis, no significant change versus outside study 2015\par Total hip -2.9, osteoporosis\par Femoral neck -2.3, osteopenia, slightly improved versus prior study\par Proximal radius -3.5, osteoporosis, no prior

## 2021-05-11 NOTE — HISTORY OF PRESENT ILLNESS
[Risedronate (Actonel)] : Risedronate [Calcium (supplements)] : calcium from a dietary supplement [Vitamin D (supplements)] : Vitamin D as a dietary supplement [Prolia (Denosumab)] : Prolia (Denosumab) [Family History of Osteoporosis] : family history of osteoporosis [Family History of Breast Cancer] : family history of breast cancer [FreeTextEntry1] : No significant interval health changes. No interval surgery, hospitalizations, fractures, or change in medications.\par \par Told of low bone density for at least 10-15 years but had refused therapy in the past due to fears of drug side effects.No h/o fx. She has no unusual risk factors for osteoporosis. BMD at [place] August 2015 spine -2.9, osteoporosis femoral neck -2.5, osteoporosis total hip -2.7, osteoporosis. Began Actonel 12/ 2015, took correctly and tolerated well, but repeat BMD 12/2017 decreased. \par \par Changed to Prolia, delayed for extraction of 2 molars. First dose April 2018. Tolerating well. No thigh pain, no interval fx. Last DDS within past 6 months. No ONJ. BMD 4/2019 indicates improvement in spine and hip and stability in lowest site; proximal radius.\par \par Of note, pt has mitral valve prolapse, pt had open heart surgery Jan 2019; repair. Pt currently on Atenolol.  [Disordered Eating] : no past or present history of disordered eating [Taking Steroids] : no past or present history of taking steroids [Kidney Stones] : no history of kidney stones [Family History of Hip Fracture] : no family history of hip fracture [Hyperparathyroidism] : no hyperparathyroidism [History of Radiation Therapy] : no history of radiation therapy [History of Blood Clots] : no history of blood clots [Previous Fragility Fracture] : no previous fragility fracture

## 2021-05-11 NOTE — END OF VISIT
[FreeTextEntry3] : I, Dejuan Lyons, authored this note working as a medical scribe for Dr. Hinojosa.  05/11/2021.  9:45AM. This note was authored by the medical scribe for me. I have reviewed, edited, and revised the note as needed. I am in agreement with the exam findings, imaging findings, and treatment plan.  Munir Hinojosa MD

## 2021-11-19 ENCOUNTER — APPOINTMENT (OUTPATIENT)
Dept: ENDOCRINOLOGY | Facility: CLINIC | Age: 76
End: 2021-11-19
Payer: MEDICARE

## 2021-11-19 LAB
25(OH)D3 SERPL-MCNC: 50.2 NG/ML
ALBUMIN SERPL ELPH-MCNC: 4.5 G/DL
ALP BLD-CCNC: 57 U/L
ALT SERPL-CCNC: 17 U/L
ANION GAP SERPL CALC-SCNC: 11 MMOL/L
AST SERPL-CCNC: 25 U/L
BILIRUB SERPL-MCNC: 0.3 MG/DL
BUN SERPL-MCNC: 18 MG/DL
CALCIUM SERPL-MCNC: 9.4 MG/DL
CHLORIDE SERPL-SCNC: 102 MMOL/L
CO2 SERPL-SCNC: 28 MMOL/L
CREAT SERPL-MCNC: 0.71 MG/DL
GLUCOSE SERPL-MCNC: 86 MG/DL
POTASSIUM SERPL-SCNC: 4.5 MMOL/L
PROT SERPL-MCNC: 6.5 G/DL
SODIUM SERPL-SCNC: 142 MMOL/L

## 2021-11-19 PROCEDURE — 96401 CHEMO ANTI-NEOPL SQ/IM: CPT

## 2021-11-19 RX ORDER — DENOSUMAB 60 MG/ML
60 INJECTION SUBCUTANEOUS
Qty: 1 | Refills: 0 | Status: COMPLETED | OUTPATIENT
Start: 2021-11-19

## 2021-11-19 RX ADMIN — DENOSUMAB 60 MG/ML: 60 INJECTION SUBCUTANEOUS at 00:00

## 2021-12-07 DIAGNOSIS — Z12.39 ENCOUNTER FOR OTHER SCREENING FOR MALIGNANT NEOPLASM OF BREAST: ICD-10-CM

## 2021-12-07 DIAGNOSIS — R92.2 INCONCLUSIVE MAMMOGRAM: ICD-10-CM

## 2022-01-18 ENCOUNTER — RESULT REVIEW (OUTPATIENT)
Age: 77
End: 2022-01-18

## 2022-01-18 ENCOUNTER — APPOINTMENT (OUTPATIENT)
Dept: ULTRASOUND IMAGING | Facility: CLINIC | Age: 77
End: 2022-01-18
Payer: COMMERCIAL

## 2022-01-18 ENCOUNTER — APPOINTMENT (OUTPATIENT)
Dept: MAMMOGRAPHY | Facility: CLINIC | Age: 77
End: 2022-01-18
Payer: COMMERCIAL

## 2022-01-18 PROCEDURE — 76641 ULTRASOUND BREAST COMPLETE: CPT | Mod: 50

## 2022-01-18 PROCEDURE — 77067 SCR MAMMO BI INCL CAD: CPT

## 2022-01-18 PROCEDURE — 77063 BREAST TOMOSYNTHESIS BI: CPT

## 2022-02-24 ENCOUNTER — APPOINTMENT (OUTPATIENT)
Dept: OBGYN | Facility: CLINIC | Age: 77
End: 2022-02-24
Payer: MEDICARE

## 2022-02-24 VITALS
HEIGHT: 64 IN | DIASTOLIC BLOOD PRESSURE: 78 MMHG | SYSTOLIC BLOOD PRESSURE: 128 MMHG | WEIGHT: 108 LBS | BODY MASS INDEX: 18.44 KG/M2

## 2022-02-24 DIAGNOSIS — Z01.419 ENCOUNTER FOR GYNECOLOGICAL EXAMINATION (GENERAL) (ROUTINE) W/OUT ABNORMAL FINDINGS: ICD-10-CM

## 2022-02-24 PROCEDURE — 82274 ASSAY TEST FOR BLOOD FECAL: CPT | Mod: QW

## 2022-02-24 PROCEDURE — 99397 PER PM REEVAL EST PAT 65+ YR: CPT

## 2022-05-23 ENCOUNTER — APPOINTMENT (OUTPATIENT)
Dept: ENDOCRINOLOGY | Facility: CLINIC | Age: 77
End: 2022-05-23
Payer: MEDICARE

## 2022-05-23 VITALS
SYSTOLIC BLOOD PRESSURE: 120 MMHG | OXYGEN SATURATION: 96 % | WEIGHT: 108 LBS | BODY MASS INDEX: 19.88 KG/M2 | TEMPERATURE: 98 F | DIASTOLIC BLOOD PRESSURE: 70 MMHG | RESPIRATION RATE: 75 BRPM | HEIGHT: 62 IN

## 2022-05-23 PROCEDURE — 96401 CHEMO ANTI-NEOPL SQ/IM: CPT

## 2022-05-23 PROCEDURE — 99213 OFFICE O/P EST LOW 20 MIN: CPT | Mod: 25

## 2022-05-23 RX ORDER — DENOSUMAB 60 MG/ML
60 INJECTION SUBCUTANEOUS
Qty: 1 | Refills: 0 | Status: COMPLETED | OUTPATIENT
Start: 2022-05-23

## 2022-05-23 RX ADMIN — DENOSUMAB 60 MG/ML: 60 INJECTION SUBCUTANEOUS at 00:00

## 2022-05-26 NOTE — HISTORY OF PRESENT ILLNESS
[Risedronate (Actonel)] : Risedronate [Calcium (supplements)] : calcium from a dietary supplement [Vitamin D (supplements)] : Vitamin D as a dietary supplement [Prolia (Denosumab)] : Prolia (Denosumab) [Family History of Osteoporosis] : family history of osteoporosis [Family History of Breast Cancer] : family history of breast cancer [FreeTextEntry1] : No significant interval health changes. No interval surgery, hospitalizations, fractures, or change in medications.\par \par Told of low bone density for at least 10-15 years but had refused therapy in the past due to fears of drug side effects.No h/o fx. She has no unusual risk factors for osteoporosis. BMD at [place] August 2015 spine -2.9, osteoporosis femoral neck -2.5, osteoporosis total hip -2.7, osteoporosis. Began Actonel 12/ 2015, took correctly and tolerated well, but repeat BMD 12/2017 decreased. \par \par Changed to Prolia, delayed for extraction of 2 molars. First dose April 2018. Tolerating well. No thigh pain, no interval fx. Last DDS within past 6 months. No ONJ. BMD 4/2019 indicates improvement in spine and hip and stability in lowest site; proximal radius.\par Last DDS few weeks ago \par Of note, pt has mitral valve prolapse, pt had open heart surgery Jan 2019; repair. Pt currently on Atenolol. \par Saw ortho for R tibia discomfort, xrays negative. Mild edema R LE\par Planning breast reconstruction Page [Disordered Eating] : no past or present history of disordered eating [Taking Steroids] : no past or present history of taking steroids [Kidney Stones] : no history of kidney stones [Family History of Hip Fracture] : no family history of hip fracture [Hyperparathyroidism] : no hyperparathyroidism [History of Radiation Therapy] : no history of radiation therapy [History of Blood Clots] : no history of blood clots [Previous Fragility Fracture] : no previous fragility fracture

## 2022-05-26 NOTE — HISTORY OF PRESENT ILLNESS
[Risedronate (Actonel)] : Risedronate [Calcium (supplements)] : calcium from a dietary supplement [Vitamin D (supplements)] : Vitamin D as a dietary supplement [Prolia (Denosumab)] : Prolia (Denosumab) [Family History of Osteoporosis] : family history of osteoporosis [Family History of Breast Cancer] : family history of breast cancer [FreeTextEntry1] : No significant interval health changes. No interval surgery, hospitalizations, fractures, or change in medications.\par \par Told of low bone density for at least 10-15 years but had refused therapy in the past due to fears of drug side effects.No h/o fx. She has no unusual risk factors for osteoporosis. BMD at [place] August 2015 spine -2.9, osteoporosis femoral neck -2.5, osteoporosis total hip -2.7, osteoporosis. Began Actonel 12/ 2015, took correctly and tolerated well, but repeat BMD 12/2017 decreased. \par \par Changed to Prolia, delayed for extraction of 2 molars. First dose April 2018. Tolerating well. No thigh pain, no interval fx. Last DDS within past 6 months. No ONJ. BMD 4/2019 indicates improvement in spine and hip and stability in lowest site; proximal radius.\par Last DDS few weeks ago \par Of note, pt has mitral valve prolapse, pt had open heart surgery Jan 2019; repair. Pt currently on Atenolol. \par Saw ortho for R tibia discomfort, xrays negative. Mild edema R LE\par Planning breast reconstruction Page [Disordered Eating] : no past or present history of disordered eating [Taking Steroids] : no past or present history of taking steroids [Kidney Stones] : no history of kidney stones [Family History of Hip Fracture] : no family history of hip fracture [Hyperparathyroidism] : no hyperparathyroidism [History of Radiation Therapy] : no history of radiation therapy [History of Blood Clots] : no history of blood clots [Previous Fragility Fracture] : no previous fragility fracture

## 2022-05-26 NOTE — ASSESSMENT
[Denosumab Therapy] : Risks  and benefits of denosumab therapy were discussed with the patient including eczema, cellulitis, osteonecrosis of the jaw and atypical femur fractures [FreeTextEntry1] : 76 year-old female with postmenopausal osteoporosis.\par \par Had bone loss at proximal 1/3 radius despite Actonel. Pt delayed 1st Prolia dose due to dental extractions, first dose in April 2018. Taking correctly, tolerating well, no ONJ or thigh pain. BMD 4/2019 indicates improvement in spine and hip and stability in lowest site; proximal radius. BMD 5/2021 indicates stable osteoporosis in spine, total hip, and low osteoporosis proximal radius, stable osteopenia in femoral neck. BMD results reviewed w/ pt. Option of Evista for breast CA prevention previously reviewed but pt declined. [facility].\par \par Hypertension: Blood pressure well controlled on current medication.\par \par F/u in 6 months

## 2022-12-07 ENCOUNTER — APPOINTMENT (OUTPATIENT)
Dept: ENDOCRINOLOGY | Facility: CLINIC | Age: 77
End: 2022-12-07
Payer: MEDICARE

## 2022-12-07 VITALS
HEART RATE: 65 BPM | BODY MASS INDEX: 19.32 KG/M2 | OXYGEN SATURATION: 99 % | HEIGHT: 62 IN | WEIGHT: 105 LBS | RESPIRATION RATE: 16 BRPM | DIASTOLIC BLOOD PRESSURE: 70 MMHG | SYSTOLIC BLOOD PRESSURE: 130 MMHG

## 2022-12-07 PROCEDURE — 99213 OFFICE O/P EST LOW 20 MIN: CPT | Mod: 25

## 2022-12-07 PROCEDURE — 96401 CHEMO ANTI-NEOPL SQ/IM: CPT

## 2022-12-07 RX ORDER — DENOSUMAB 60 MG/ML
60 INJECTION SUBCUTANEOUS
Qty: 1 | Refills: 0 | Status: COMPLETED | OUTPATIENT
Start: 2022-12-07

## 2022-12-07 RX ADMIN — DENOSUMAB 60 MG/ML: 60 INJECTION SUBCUTANEOUS at 00:00

## 2022-12-07 NOTE — END OF VISIT
[FreeTextEntry3] : This note was written by Reta Mata on ( December 7 , 2022) acting as a medical scribe for Dr. Hinojosa This note was authored by the medical scribe for me. I have reviewed, edited, and revised the note as needed. I am in agreement with the exam findings, imaging findings, and treatment plan.  Munir Hinojosa MD

## 2022-12-07 NOTE — ASSESSMENT
[Denosumab Therapy] : Risks  and benefits of denosumab therapy were discussed with the patient including eczema, cellulitis, osteonecrosis of the jaw and atypical femur fractures [FreeTextEntry1] : 77 year-old female with postmenopausal osteoporosis.\par \par Had bone loss at proximal 1/3 radius despite Actonel. Pt delayed 1st Prolia dose due to dental extractions, first dose in April 2018. Taking correctly, tolerating well, no ONJ or thigh pain. BMD 4/2019 indicates improvement in spine and hip and stability in lowest site; proximal radius. BMD 5/2021 indicates stable osteoporosis in spine, total hip, and low osteoporosis proximal radius, stable osteopenia in femoral neck. BMD results reviewed w/ pt. Option of Evista for breast CA prevention previously reviewed but pt declined. [facility]par \par \par Call Dr. Shira Lozoya for labs \par \par F/u in 6 months w BMD

## 2022-12-07 NOTE — HISTORY OF PRESENT ILLNESS
[Risedronate (Actonel)] : Risedronate [Calcium (supplements)] : calcium from a dietary supplement [Vitamin D (supplements)] : Vitamin D as a dietary supplement [Prolia (Denosumab)] : Prolia (Denosumab) [Family History of Osteoporosis] : family history of osteoporosis [Family History of Breast Cancer] : family history of breast cancer [FreeTextEntry1] : Patient returns for a follow up visit for osteoporosis . Since the last visit pt has no significant interval health changes. No interval surgery, hospitalizations, fractures, or change in medications.\par \par Told of low bone density for at least 10-15 years but had refused therapy in the past due to fears of drug side effects.No h/o fx. She has no unusual risk factors for osteoporosis. BMD at [place] August 2015 spine -2.9, osteoporosis femoral neck -2.5, osteoporosis total hip -2.7, osteoporosis. Began Actonel 12/ 2015, took correctly and tolerated well, but repeat BMD 12/2017 decreased. \par \par Changed to Prolia, delayed for extraction of 2 molars. First dose April 2018. Tolerating well. No thigh pain, no interval fx. Last DDS within past 6 months. No ONJ. BMD 4/2019 indicates improvement in spine and hip and stability in lowest site; proximal radius.\par \par Of note, pt has mitral valve prolapse, pt had open heart surgery Jan 2019; repair. Pt currently on Atenolol.  [Disordered Eating] : no past or present history of disordered eating [Taking Steroids] : no past or present history of taking steroids [Kidney Stones] : no history of kidney stones [Family History of Hip Fracture] : no family history of hip fracture [Hyperparathyroidism] : no hyperparathyroidism [History of Radiation Therapy] : no history of radiation therapy [History of Blood Clots] : no history of blood clots [Previous Fragility Fracture] : no previous fragility fracture

## 2023-01-18 ENCOUNTER — APPOINTMENT (OUTPATIENT)
Dept: ULTRASOUND IMAGING | Facility: CLINIC | Age: 78
End: 2023-01-18

## 2023-01-18 ENCOUNTER — APPOINTMENT (OUTPATIENT)
Dept: MAMMOGRAPHY | Facility: CLINIC | Age: 78
End: 2023-01-18

## 2023-03-08 DIAGNOSIS — Z12.39 ENCOUNTER FOR OTHER SCREENING FOR MALIGNANT NEOPLASM OF BREAST: ICD-10-CM

## 2023-03-16 ENCOUNTER — APPOINTMENT (OUTPATIENT)
Dept: MAMMOGRAPHY | Facility: CLINIC | Age: 78
End: 2023-03-16
Payer: MEDICARE

## 2023-03-16 ENCOUNTER — APPOINTMENT (OUTPATIENT)
Dept: ULTRASOUND IMAGING | Facility: CLINIC | Age: 78
End: 2023-03-16
Payer: MEDICARE

## 2023-03-16 ENCOUNTER — RESULT REVIEW (OUTPATIENT)
Age: 78
End: 2023-03-16

## 2023-03-16 PROCEDURE — 76641 ULTRASOUND BREAST COMPLETE: CPT | Mod: 50

## 2023-03-16 PROCEDURE — 77063 BREAST TOMOSYNTHESIS BI: CPT

## 2023-03-16 PROCEDURE — 77067 SCR MAMMO BI INCL CAD: CPT

## 2023-05-05 ENCOUNTER — RX RENEWAL (OUTPATIENT)
Age: 78
End: 2023-05-05

## 2023-06-27 ENCOUNTER — APPOINTMENT (OUTPATIENT)
Dept: OBGYN | Facility: CLINIC | Age: 78
End: 2023-06-27
Payer: MEDICARE

## 2023-06-27 VITALS
HEIGHT: 62 IN | BODY MASS INDEX: 20.43 KG/M2 | SYSTOLIC BLOOD PRESSURE: 122 MMHG | WEIGHT: 111 LBS | DIASTOLIC BLOOD PRESSURE: 67 MMHG

## 2023-06-27 DIAGNOSIS — N95.2 POSTMENOPAUSAL ATROPHIC VAGINITIS: ICD-10-CM

## 2023-06-27 DIAGNOSIS — Z01.419 ENCOUNTER FOR GYNECOLOGICAL EXAMINATION (GENERAL) (ROUTINE) W/OUT ABNORMAL FINDINGS: ICD-10-CM

## 2023-06-27 PROCEDURE — 99213 OFFICE O/P EST LOW 20 MIN: CPT | Mod: 25

## 2023-06-27 PROCEDURE — 82270 OCCULT BLOOD FECES: CPT

## 2023-06-27 NOTE — PLAN
[FreeTextEntry1] : Health Maintenance:\par Normal gyn examination.\par Rx given for mammogram and breast sonogram.\par F/u with Internist for screening laboratories.\par Nutrition and exercised discussed.\par \par Vaginal Atrophy:\par Advised OTC lubricants, coconut oil \par \par Osteoporosis:\par Currently on Prolia, continue to follow w/ endocrinologist\par Bone density scheduled for tomorrow \par \par RTO in 1 year, or PRN.

## 2023-06-27 NOTE — HISTORY OF PRESENT ILLNESS
[FreeTextEntry1] : 06/27/2023 MONY LAST 77 year old female presents for follow-up visit, for vaginal atrophy and osteoporosis.\par Patient offers no complaints. No abnormal vaginal bleeding, pain, or vaginal discharge.\par Reviewed last mammogram and breast sonogram from 3/23 - [facility].\par Currently on Prolia, managed by Dr. Hinojosa. Bone density scheduled for tomorrow.\par Followed yearly by PCP Dr. Lozoya. Will discuss Cologuard at time of annual wellness visit in 9/23.\par Denies changes in medical status, medications, serious illness, hospitalizations, and surgeries.\par Reviewed patient's current medications.\par \par Of note, pt's  passed in 2/23. \par \par SurgHx mitral valve sx\par FamHx mother and grandmother w/ colon ca\par Meds Neurontin, baby ASA, Prolia

## 2023-06-27 NOTE — PHYSICAL EXAM

## 2023-06-28 ENCOUNTER — APPOINTMENT (OUTPATIENT)
Dept: ENDOCRINOLOGY | Facility: CLINIC | Age: 78
End: 2023-06-28
Payer: MEDICARE

## 2023-06-28 VITALS — SYSTOLIC BLOOD PRESSURE: 94 MMHG | HEART RATE: 66 BPM | OXYGEN SATURATION: 99 % | DIASTOLIC BLOOD PRESSURE: 58 MMHG

## 2023-06-28 VITALS — BODY MASS INDEX: 18.67 KG/M2 | WEIGHT: 108 LBS | HEIGHT: 63.8 IN

## 2023-06-28 LAB — HPV HIGH+LOW RISK DNA PNL CVX: NOT DETECTED

## 2023-06-28 PROCEDURE — 96401 CHEMO ANTI-NEOPL SQ/IM: CPT

## 2023-06-28 PROCEDURE — 99213 OFFICE O/P EST LOW 20 MIN: CPT | Mod: 25

## 2023-06-28 PROCEDURE — 77080 DXA BONE DENSITY AXIAL: CPT

## 2023-06-28 PROCEDURE — ZZZZZ: CPT

## 2023-06-28 RX ORDER — DENOSUMAB 60 MG/ML
60 INJECTION SUBCUTANEOUS
Qty: 1 | Refills: 0 | Status: COMPLETED | OUTPATIENT
Start: 2023-06-28

## 2023-06-28 RX ADMIN — DENOSUMAB 60 MG/ML: 60 INJECTION SUBCUTANEOUS at 00:00

## 2023-06-28 NOTE — PHYSICAL EXAM
[Alert] : alert [Well Nourished] : well nourished [No Acute Distress] : no acute distress [Well Developed] : well developed [Normal Sclera/Conjunctiva] : normal sclera/conjunctiva [EOMI] : extra ocular movement intact [No Proptosis] : no proptosis [No Thyroid Nodules] : no palpable thyroid nodules [Thyroid Not Enlarged] : the thyroid was not enlarged [Clear to Auscultation] : lungs were clear to auscultation bilaterally [Normal S1, S2] : normal S1 and S2 [Normal Rate] : heart rate was normal [Regular Rhythm] : with a regular rhythm [No Edema] : no peripheral edema [Normal Bowel Sounds] : normal bowel sounds [Not Tender] : non-tender [Not Distended] : not distended [Soft] : abdomen soft [Normal Anterior Cervical Nodes] : no anterior cervical lymphadenopathy [No Spinal Tenderness] : no spinal tenderness [Spine Straight] : spine straight [No Stigmata of Cushings Syndrome] : no stigmata of Cushings Syndrome [Normal Gait] : normal gait [Normal Reflexes] : deep tendon reflexes were 2+ and symmetric [No Tremors] : no tremors [Oriented x3] : oriented to person, place, and time

## 2023-06-29 NOTE — ASSESSMENT
[Denosumab Therapy] : Risks  and benefits of denosumab therapy were discussed with the patient including eczema, cellulitis, osteonecrosis of the jaw and atypical femur fractures [FreeTextEntry1] : 77 year-old female with postmenopausal osteoporosis.\par \par Had bone loss at proximal 1/3 radius despite Actonel. Pt delayed 1st Prolia dose due to dental extractions, first dose in April 2018. Taking correctly, tolerating well, no ONJ or thigh pain. BMD 4/2019 indicates improvement in spine and hip and stability in lowest site; proximal radius. BMD 5/2021 indicates stable osteoporosis in spine, total hip, and low osteoporosis proximal radius, stable osteopenia in femoral neck. BMD results reviewed w/ pt. Option of Evista for breast CA prevention previously reviewed but pt declined. [facility]par \par \par Call Dr. Shira Lozoya for labs \par

## 2023-06-29 NOTE — HISTORY OF PRESENT ILLNESS
[Risedronate (Actonel)] : Risedronate [Calcium (supplements)] : calcium from a dietary supplement [Vitamin D (supplements)] : Vitamin D as a dietary supplement [Prolia (Denosumab)] : Prolia (Denosumab) [Family History of Osteoporosis] : family history of osteoporosis [Family History of Breast Cancer] : family history of breast cancer [FreeTextEntry1] : Patient returns for a follow up visit for osteoporosis . Since the last visit pt has no significant interval health changes. No interval surgery, hospitalizations, fractures, or change in medications.\par \par Told of low bone density for at least 10-15 years but had refused therapy in the past due to fears of drug side effects.No h/o fx. She has no unusual risk factors for osteoporosis. BMD at [place] August 2015 spine -2.9, osteoporosis femoral neck -2.5, osteoporosis total hip -2.7, osteoporosis. Began Actonel 12/ 2015, took correctly and tolerated well, but repeat BMD 12/2017 decreased. \par \par Changed to Prolia, delayed for extraction of 2 molars. First dose April 2018. Tolerating well. No thigh pain, no interval fx. Last DDS within past 6 months. No ONJ. BMD 4/2019 indicates improvement in spine and hip and stability in lowest site; proximal radius.\par \par Of note, pt has mitral valve prolapse, pt had open heart surgery Jan 2019; repair. Pt currently on Atenolol.  [Disordered Eating] : no past or present history of disordered eating [Taking Steroids] : no past or present history of taking steroids [Kidney Stones] : no history of kidney stones [Family History of Hip Fracture] : no family history of hip fracture [Hyperparathyroidism] : no hyperparathyroidism [History of Radiation Therapy] : no history of radiation therapy [History of Blood Clots] : no history of blood clots [Previous Fragility Fracture] : no previous fragility fracture

## 2023-06-29 NOTE — PROCEDURE
[FreeTextEntry1] : Bone Mineral Density: 06/28/2023 \par Indication: Comparison to 2021\par Spine -2.3 osteopenia +4.6%, greater than 10% increased versus baseline\par Total hip: -2.5 osteoporosis no significant change versus 2021+9.8% versus 2016\par Femoral neck: -2.0 osteopenia no significant change\par Proximal radius: -3.6 osteoporosis no significant change\par \par Bone mineral density: 05/11/2021 \par Indication: vs. 2019\par Spine: -2.6 osteoporosis, no significant change\par Total hip: -2.6 osteoporosis, no significant change\par Femoral neck: -1.9 osteopenia, no significant change\par Proximal radius: -3.6 osteoporosis, no significant change\par \par Bone mineral density: 04/19/2019 \par Indication: vs. 2017 \par Spine: -2.7 osteoporosis (+5.6%)\par Total hip: -2.7 osteoporosis, no significant change (+5.6% vs 2016)\par Femoral neck: -1.9 osteopenia (+7.9%)\par Proximal radius: -3.7 osteoporosis, no significant change \par \par Bone mineral density 12/20/17\par indication: assess response to medication in setting of severely low radius, no prior BMD at site\par spine -3.1, osteoporosis, no significant change from 2016\par total hip -2.8 osteoporosis, no significant change from 2016\par femoral neck -2.3 osteopenia no significant change from 2016\par proximal radius -3.8 osteoporosis decreased from prior study in 2016\par \par Bone mineral density test December 21, 2016\par  Spine -2.9, osteoporosis, no significant change versus outside study 2015\par Total hip -2.9, osteoporosis\par Femoral neck -2.3, osteopenia, slightly improved versus prior study\par Proximal radius -3.5, osteoporosis, no prior

## 2023-07-04 LAB — CYTOLOGY CVX/VAG DOC THIN PREP: ABNORMAL

## 2023-07-06 ENCOUNTER — NON-APPOINTMENT (OUTPATIENT)
Age: 78
End: 2023-07-06

## 2023-12-05 RX ORDER — DENOSUMAB 60 MG/ML
60 INJECTION SUBCUTANEOUS
Qty: 1 | Refills: 1 | Status: ACTIVE | COMMUNITY
Start: 2019-10-09

## 2024-01-05 ENCOUNTER — APPOINTMENT (OUTPATIENT)
Dept: ENDOCRINOLOGY | Facility: CLINIC | Age: 79
End: 2024-01-05
Payer: MEDICARE

## 2024-01-05 DIAGNOSIS — M81.0 AGE-RELATED OSTEOPOROSIS W/OUT CURRENT PATHOLOGICAL FRACTURE: ICD-10-CM

## 2024-01-05 PROCEDURE — 96401 CHEMO ANTI-NEOPL SQ/IM: CPT

## 2024-01-05 RX ORDER — DENOSUMAB 60 MG/ML
60 INJECTION SUBCUTANEOUS
Qty: 1 | Refills: 0 | Status: COMPLETED | OUTPATIENT
Start: 2024-01-03

## 2024-04-17 ENCOUNTER — APPOINTMENT (OUTPATIENT)
Dept: ULTRASOUND IMAGING | Facility: CLINIC | Age: 79
End: 2024-04-17
Payer: MEDICARE

## 2024-04-17 ENCOUNTER — RESULT REVIEW (OUTPATIENT)
Age: 79
End: 2024-04-17

## 2024-04-17 ENCOUNTER — APPOINTMENT (OUTPATIENT)
Dept: MAMMOGRAPHY | Facility: CLINIC | Age: 79
End: 2024-04-17
Payer: MEDICARE

## 2024-04-17 PROCEDURE — 77067 SCR MAMMO BI INCL CAD: CPT

## 2024-04-17 PROCEDURE — 76641 ULTRASOUND BREAST COMPLETE: CPT | Mod: 50

## 2024-04-17 PROCEDURE — 77063 BREAST TOMOSYNTHESIS BI: CPT

## 2024-07-17 ENCOUNTER — APPOINTMENT (OUTPATIENT)
Dept: ENDOCRINOLOGY | Facility: CLINIC | Age: 79
End: 2024-07-17
Payer: MEDICARE

## 2024-07-17 VITALS
OXYGEN SATURATION: 99 % | HEART RATE: 59 BPM | BODY MASS INDEX: 18.48 KG/M2 | WEIGHT: 107 LBS | DIASTOLIC BLOOD PRESSURE: 48 MMHG | SYSTOLIC BLOOD PRESSURE: 98 MMHG

## 2024-07-17 DIAGNOSIS — M81.0 AGE-RELATED OSTEOPOROSIS W/OUT CURRENT PATHOLOGICAL FRACTURE: ICD-10-CM

## 2024-07-17 PROCEDURE — 99213 OFFICE O/P EST LOW 20 MIN: CPT | Mod: 25

## 2024-07-17 PROCEDURE — 96401 CHEMO ANTI-NEOPL SQ/IM: CPT

## 2024-07-17 RX ORDER — DENOSUMAB 60 MG/ML
60 INJECTION SUBCUTANEOUS
Qty: 1 | Refills: 0 | Status: COMPLETED | OUTPATIENT
Start: 2024-07-17

## 2024-07-17 RX ADMIN — DENOSUMAB 60 MG/ML: 60 INJECTION SUBCUTANEOUS at 00:00

## 2024-07-18 LAB
ALBUMIN SERPL ELPH-MCNC: 4.1 G/DL
ALP BLD-CCNC: 66 U/L
ALT SERPL-CCNC: 11 U/L
ANION GAP SERPL CALC-SCNC: 13 MMOL/L
AST SERPL-CCNC: 20 U/L
BILIRUB SERPL-MCNC: 0.3 MG/DL
BUN SERPL-MCNC: 16 MG/DL
CALCIUM SERPL-MCNC: 9.3 MG/DL
CHLORIDE SERPL-SCNC: 102 MMOL/L
CO2 SERPL-SCNC: 28 MMOL/L
CREAT SERPL-MCNC: 0.67 MG/DL
EGFR: 89 ML/MIN/1.73M2
GLUCOSE SERPL-MCNC: 87 MG/DL
POTASSIUM SERPL-SCNC: 4.5 MMOL/L
PROT SERPL-MCNC: 6.5 G/DL
SODIUM SERPL-SCNC: 143 MMOL/L

## 2024-07-25 LAB — COLLAGEN CTX SERPL-MCNC: 31 PG/ML

## 2024-12-07 ENCOUNTER — RX RENEWAL (OUTPATIENT)
Age: 79
End: 2024-12-07

## 2025-01-21 ENCOUNTER — APPOINTMENT (OUTPATIENT)
Dept: ENDOCRINOLOGY | Facility: CLINIC | Age: 80
End: 2025-01-21
Payer: MEDICARE

## 2025-01-21 DIAGNOSIS — M81.0 AGE-RELATED OSTEOPOROSIS W/OUT CURRENT PATHOLOGICAL FRACTURE: ICD-10-CM

## 2025-01-21 PROCEDURE — 96401 CHEMO ANTI-NEOPL SQ/IM: CPT

## 2025-01-21 RX ORDER — DENOSUMAB 60 MG/ML
60 INJECTION SUBCUTANEOUS
Qty: 1 | Refills: 0 | Status: COMPLETED | OUTPATIENT
Start: 2025-01-17

## 2025-05-01 NOTE — ED ADULT NURSE NOTE - CARDIO WDL
swelling, warmth, or redness in the area.  Red streaks leading from the area.  Pus draining from the wound.  A new or higher fever.   Watch closely for changes in your health, and be sure to contact your doctor if you have any problems.  Where can you learn more?  Go to https://www.[email or web address].net/patientEd and enter C340 to learn more about \"Infection After Surgery: Care Instructions.\"  Current as of: July 31, 2024  Content Version: 14.4  © 2746-0107 [employer].   Care instructions adapted under license by [employer]. If you have questions about a medical condition or this instruction, always ask your healthcare professional. [employer], disclaims any warranty or liability for your use of this information.       Nausea and Vomiting After Surgery: Care Instructions  Your Care Instructions     After you've had surgery, you may feel sick to your stomach (nauseated) or you may vomit. Sometimes anesthesia can make you feel sick. It's a common side effect and often doesn't last long. Pain also can make you feel sick or vomit. After the anesthesia wears off, you may feel pain from the incision (cut). That pain could then upset your stomach. Taking pain medicine can also make you feel sick to your stomach.  Whatever the cause, you may get medicine that can help. There are also some things you can do at home to prevent nausea and feel better.  The doctor has checked you carefully, but problems can develop later. If you notice any problems or new symptoms, get medical treatment right away.  Follow-up care is a key part of your treatment and safety. Be sure to make and go to all appointments, and call your doctor if you are having problems. It's also a good idea to know your test results and keep a list of the medicines you take.  How can you care for yourself at home?  Be safe with medicines. Read and follow all instructions on the label.  If the doctor gave you a prescription medicine for pain, take  Normal rate, regular rhythm, normal S1, S2 heart sounds heard.

## 2025-05-07 ENCOUNTER — APPOINTMENT (OUTPATIENT)
Dept: MAMMOGRAPHY | Facility: CLINIC | Age: 80
End: 2025-05-07
Payer: MEDICARE

## 2025-05-07 ENCOUNTER — APPOINTMENT (OUTPATIENT)
Dept: ULTRASOUND IMAGING | Facility: CLINIC | Age: 80
End: 2025-05-07
Payer: MEDICARE

## 2025-05-07 PROCEDURE — 76641 ULTRASOUND BREAST COMPLETE: CPT | Mod: 50

## 2025-05-07 PROCEDURE — 77063 BREAST TOMOSYNTHESIS BI: CPT

## 2025-05-07 PROCEDURE — 77067 SCR MAMMO BI INCL CAD: CPT

## 2025-08-05 ENCOUNTER — APPOINTMENT (OUTPATIENT)
Dept: ENDOCRINOLOGY | Facility: CLINIC | Age: 80
End: 2025-08-05
Payer: MEDICARE

## 2025-08-05 VITALS
BODY MASS INDEX: 19.6 KG/M2 | HEART RATE: 79 BPM | HEIGHT: 63.5 IN | OXYGEN SATURATION: 98 % | WEIGHT: 112 LBS | DIASTOLIC BLOOD PRESSURE: 70 MMHG | SYSTOLIC BLOOD PRESSURE: 114 MMHG

## 2025-08-05 DIAGNOSIS — M27.0 DEVELOPMENTAL DISORDERS OF JAWS: ICD-10-CM

## 2025-08-05 DIAGNOSIS — M81.0 AGE-RELATED OSTEOPOROSIS W/OUT CURRENT PATHOLOGICAL FRACTURE: ICD-10-CM

## 2025-08-05 PROCEDURE — 96401 CHEMO ANTI-NEOPL SQ/IM: CPT

## 2025-08-05 PROCEDURE — ZZZZZ: CPT

## 2025-08-05 PROCEDURE — 99214 OFFICE O/P EST MOD 30 MIN: CPT | Mod: 25

## 2025-08-05 PROCEDURE — 77080 DXA BONE DENSITY AXIAL: CPT

## 2025-08-05 RX ORDER — DENOSUMAB 60 MG/ML
60 INJECTION SUBCUTANEOUS
Qty: 1 | Refills: 0 | Status: COMPLETED | OUTPATIENT
Start: 2025-08-05

## 2025-08-05 RX ADMIN — DENOSUMAB 0 MG/ML: 60 INJECTION SUBCUTANEOUS at 00:00

## 2025-08-06 PROBLEM — M27.0 TORUS MANDIBULARIS: Status: ACTIVE | Noted: 2025-08-06

## 2025-08-06 RX ORDER — PREGABALIN 50 MG/1
50 CAPSULE ORAL
Refills: 0 | Status: ACTIVE | COMMUNITY